# Patient Record
Sex: FEMALE | Race: WHITE
[De-identification: names, ages, dates, MRNs, and addresses within clinical notes are randomized per-mention and may not be internally consistent; named-entity substitution may affect disease eponyms.]

---
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
## 2020-07-12 ENCOUNTER — HOSPITAL ENCOUNTER (EMERGENCY)
Dept: HOSPITAL 38 - CC.ED | Age: 20
LOS: 1 days | Discharge: HOME | End: 2020-07-13
Payer: COMMERCIAL

## 2020-07-12 VITALS — DIASTOLIC BLOOD PRESSURE: 79 MMHG | SYSTOLIC BLOOD PRESSURE: 135 MMHG | HEART RATE: 95 BPM

## 2020-07-12 DIAGNOSIS — J45.909: ICD-10-CM

## 2020-07-12 DIAGNOSIS — N39.0: Primary | ICD-10-CM

## 2020-07-13 RX ADMIN — Medication STA MG: at 00:07

## 2020-07-13 RX ADMIN — NITROFURANTOIN MONOHYDRATE AND NITROFURANTOIN MACROCRYSTALLINE STA MG: 75; 25 CAPSULE ORAL at 00:07

## 2020-07-13 NOTE — EDM.PDOC
ED HPI GENERAL MEDICAL PROBLEM





- General


Chief Complaint: Genitourinary Problem


Stated Complaint: burning with urination


Time Seen by Provider: 07/12/20 23:53


Source of Information: Reports: Patient


History Limitations: Reports: No Limitations





- History of Present Illness


INITIAL COMMENTS - FREE TEXT/NARRATIVE: 





This patient is a 20 year old female that presents to the ER with complaint of 

dysuria. She reports it started at 2pm today. She reports blood with wiping.  


Onset: Today


Onset Date: 07/13/20


Onset Time: 14:00


Duration: Hour(s): (10)


Quality: Reports: Burning


Severity: Moderate


Worsens with: Reports: Other (urination)


Associated Symptoms: Reports: No Other Symptoms.  Denies: Confusion, Chest Pain,

Cough, cough w sputum, Diaphoresis, Fever/Chills, Headaches, Loss of Appetite, 

Malaise, Nausea/Vomiting, Rash, Seizure, Shortness of Breath, Syncope, Weakness


  ** Groin


Pain Score (Numeric/FACES): 5





- Related Data


                                    Allergies











Allergy/AdvReac Type Severity Reaction Status Date / Time


 


No Known Allergies Allergy   Verified 07/12/20 23:36











Home Meds: 


                                    Home Meds





Albuterol [IJD: Ventolin HFA] 1 puff INH ASDIRECTED PRN 07/03/16 [History]


norgestimate-ethinyl estradioL [Sprintec 28 Day Tablet] 1 tab PO DAILY 07/12/20 

[History]


Nitrofurantoin Monohyd/M-Cryst [Macrobid 100 mg Capsule] 100 mg PO BID 7 Days 

#14 capsule 07/13/20 [Rx]


Phenazopyridine [Pyridium] 100 mg PO TID PRN #6 tab 07/13/20 [Rx]











Past Medical History





- Past Health History


Medical/Surgical History: Denies Medical/Surgical History


Respiratory History: Reports: Other (See Below)


Other Respiratory History: Reactive airway disease


Psychiatric History: Reports: Anxiety, Depression





Social & Family History





- Family History


Family Medical History: Noncontributory


Cardiac: Reports: Hypertension





- Tobacco Use


Smoking Status *Q: Never Smoker





- Caffeine Use


Caffeine Use: Reports: None





- Recreational Drug Use


Recreational Drug Use: No





- Sexual History


Sexual History: Reports: None





- Living Situation & Occupation


Living situation: Reports: Single, with Family


Occupation: Student





ED ROS GENERAL





- Review of Systems


Review Of Systems: See Below


Constitutional: Reports: No Symptoms


HEENT: Reports: No Symptoms


Respiratory: Reports: No Symptoms


Cardiovascular: Reports: No Symptoms


Endocrine: Reports: No Symptoms


GI/Abdominal: Reports: Abdominal Pain.  Denies: Nausea, Vomiting


: Reports: Dysuria, Hematuria.  Denies: Flank Pain


Musculoskeletal: Reports: Back Pain


Skin: Reports: No Symptoms


Neurological: Reports: No Symptoms


Psychiatric: Reports: No Symptoms


Hematologic/Lymphatic: Reports: No Symptoms


Immunologic: Reports: No Symptoms





ED EXAM, RENAL/





- Physical Exam


Exam: See Below


Exam Limited By: No Limitations


General Appearance: Alert, WD/WN, No Apparent Distress


Eye Exam: Bilateral Eye: Normal Inspection, PERRL


Ears: Normal External Exam, Normal Canal, Hearing Grossly Normal, Normal TMs


Nose: Normal Inspection, Normal Mucosa, No Blood


Throat/Mouth: Normal Inspection, Normal Lips, Normal Teeth, Normal Gums, Normal 

Oropharynx, Normal Voice, No Airway Compromise


Head: Atraumatic, Normocephalic


Neck: Normal Inspection, Supple, Non-Tender, Full Range of Motion


Respiratory/Chest: No Respiratory Distress, Lungs Clear, Normal Breath Sounds, 

No Accessory Muscle Use


Cardiovascular: Normal Peripheral Pulses, Regular Rate, Rhythm, No Edema, No 

Gallop, No JVD, No Murmur, No Rub


GI/Abdominal: Normal Bowel Sounds, Soft, Non-Tender, No Organomegaly


Back Exam: Normal Inspection.  No: CVA Tenderness (L), CVA Tenderness (R)


Extremities: Normal Inspection, Normal Range of Motion, Non-Tender, No Pedal 

Edema, Normal Capillary Refill


Neurological: Alert, Oriented


Psychiatric: Normal Affect, Normal Mood


Skin Exam: Warm, Dry, Intact, Normal Color, No Rash


Lymphatic: No Adenopathy





Course





- Vital Signs


Last Recorded V/S: 


                                Last Vital Signs











Temp  99.4 F   07/12/20 23:32


 


Pulse  95   07/12/20 23:32


 


Resp  18   07/12/20 23:32


 


BP  135/79   07/12/20 23:32


 


Pulse Ox  100   07/12/20 23:32














- Orders/Labs/Meds


Orders: 


                               Active Orders 24 hr











 Category Date Time Status


 


 CULTURE URINE [RM] Stat Lab  07/12/20 23:44 Received











Labs: 


                                Laboratory Tests











  07/12/20 07/12/20 Range/Units





  23:44 23:44 


 


Urine Color  Light yellow   (YELLOW)  


 


Urine Appearance  Slightly cloudy   (CLEAR)  


 


Urine pH  6.5   (4.5-8.0)  


 


Ur Specific Gravity  1.010   (1.003-1.020)  


 


Urine Protein  30 H   (NEGATIVE)  mg/dL


 


Urine Glucose (UA)  Negative   (NEGATIVE)  mg/dL


 


Urine Ketones  Negative   (NEGATIVE)  mg/dL


 


Urine Occult Blood  Large H   (NEGATIVE)  


 


Urine Nitrite  Negative   (NEGATIVE)  


 


Urine Bilirubin  Negative   (NEGATIVE)  


 


Urine Urobilinogen  0.2   (0.2-1.0)  EU/dL


 


Ur Leukocyte Esterase  Large H   (NEGATIVE)  


 


Urine RBC  5-10 H   (0-5)  /HPF


 


Urine WBC   H   (0-5)  /HPF


 


Ur Epithelial Cells  Few H   (NOT SEEN)  /HPF


 


Urine Bacteria  Few H   (NOT SEEN)  /HPF


 


Urine HCG, Qual   Negative  











Meds: 


Medications














Discontinued Medications














Generic Name Dose Route Start Last Admin





  Trade Name Freq  PRN Reason Stop Dose Admin


 


Nitrofurantoin Macrocrystals  100 mg  07/13/20 00:00  07/13/20 00:07





  Macrobid  PO  07/13/20 00:01  100 mg





  NOW STA   Administration


 


Phenazopyridine HCl  95 mg  07/13/20 00:00  07/13/20 00:07





  Urinary Pain Relief  PO  07/13/20 00:01  95 mg





  NOW STA   Administration














Departure





- Departure


Time of Disposition: 00:03


Disposition: Home, Self-Care 01


Condition: Fair


Clinical Impression: 


 UTI, Urinary tract infectious disease








- Discharge Information


*PRESCRIPTION DRUG MONITORING PROGRAM REVIEWED*: Not Applicable


*COPY OF PRESCRIPTION DRUG MONITORING REPORT IN PATIENT RUPINDER: Not Applicable


Prescriptions: 


Nitrofurantoin Monohyd/M-Cryst [Macrobid 100 mg Capsule] 100 mg PO BID 7 Days 

#14 capsule


Phenazopyridine [Pyridium] 100 mg PO TID PRN #6 tab


 PRN Reason: Dysuria


Instructions:  Urinary Tract Infection, Adult, Easy-to-Read


Forms:  ED Department Discharge


Additional Instructions: 


Followup with your primary care provider in 2-3 days for recheck and to ensure 

your on the right antibiotic


Return to the ER for worsening of condition or any emergent concerns such as 

fever, vomiting, increase in pain


Macrobid 100mg 1 pill twice a day for 7 days #14 no refill


Pyridium 100mg 1 pill three times a day for 2 days #6 no refill For burning with

 urination


Increase water and cranberry juice intake





Sepsis Event Note (ED)





- Evaluation


Sepsis Screening Result: No Definite Risk





- Focused Exam


Vital Signs: 


                                   Vital Signs











  Temp Pulse Resp BP Pulse Ox


 


 07/12/20 23:32  99.4 F  95  18  135/79  100














- My Orders


Last 24 Hours: 


My Active Orders





07/12/20 23:44


CULTURE URINE [RM] Stat 














- Assessment/Plan


Last 24 Hours: 


My Active Orders





07/12/20 23:44


CULTURE URINE [RM] Stat 











Plan: 





PLEASE SEE RN NOTE FOR PFSH